# Patient Record
Sex: FEMALE | Race: OTHER
[De-identification: names, ages, dates, MRNs, and addresses within clinical notes are randomized per-mention and may not be internally consistent; named-entity substitution may affect disease eponyms.]

---

## 2021-04-10 ENCOUNTER — HOSPITAL ENCOUNTER (EMERGENCY)
Dept: HOSPITAL 46 - ED | Age: 41
Discharge: HOME | End: 2021-04-10
Payer: COMMERCIAL

## 2021-04-10 VITALS — BODY MASS INDEX: 29.44 KG/M2 | WEIGHT: 159.99 LBS | HEIGHT: 62 IN

## 2021-04-10 DIAGNOSIS — F22: Primary | ICD-10-CM

## 2021-04-10 DIAGNOSIS — F17.200: ICD-10-CM

## 2021-04-10 DIAGNOSIS — F15.10: ICD-10-CM

## 2021-04-10 NOTE — XMS
PreManage Notification: WINNIE BERMUDEZ MRN:[id number]
 
Security Information
 
Security Events
No recent Security Events currently on file
 
 
 
CRITERIA MET
------------
- [facility] - Has Care Guidelines
 
 
CARE PROVIDERS
There are no care providers on record at this time.
 
Guidelines Source: [facility] - [place]
Guidelines Date: 06/30/2020
 
Care Coordination:
    Member is currently not engaged in [employer] services. If Mental Health services 
    are needed, please contact:
    [place] [phone]
    [place]/ [place] [phone]
    [facility] Line [phone]
 
 
E.D. VISIT COUNT (12 MO.)
-------------------------------------------------------------------------------------
1 [facility]
-------------------------------------------------------------------------------------
TOTAL 1
-------------------------------------------------------------------------------------
NOTE: Visits indicate total known visits.
 
ED/UCC VISIT TRACKING (12 MO.)
-------------------------------------------------------------------------------------
04/10/2021 19:26
[facility] [place]
 
TYPE: Emergency
 
COMPLAINT:
- HEAD ISSUES
-------------------------------------------------------------------------------------
 
 
INPATIENT VISIT TRACKING (12 MO.)
No inpatient visits to display in this time frame
 
[email or web address]

## 2021-04-28 ENCOUNTER — HOSPITAL ENCOUNTER (EMERGENCY)
Dept: HOSPITAL 46 - ED | Age: 41
LOS: 1 days | Discharge: HOME | End: 2021-04-29
Payer: MEDICARE

## 2021-04-28 VITALS — HEIGHT: 62 IN | WEIGHT: 159.99 LBS | BODY MASS INDEX: 29.44 KG/M2

## 2021-04-28 DIAGNOSIS — S82.842A: Primary | ICD-10-CM

## 2021-04-28 DIAGNOSIS — R45.1: ICD-10-CM

## 2021-04-28 DIAGNOSIS — F17.200: ICD-10-CM
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
## 2021-04-28 NOTE — XMS
PreManage Notification: WINNIE BERMUDEZ MRN:[id number]
 
Security Information
 
Security Events
No recent Security Events currently on file
 
 
 
CRITERIA MET
------------
- [facility] [place] [facility]
- [facility] - 2 Visits in 30 Days
 
 
CARE PROVIDERS
There are no care providers on record at this time.
 
Guidelines Source: [facility] [place]
Guidelines Date: 06/30/2020
 
Care Coordination:
    Member is currently not engaged in [employer] services. If Mental Health services 
    are needed, please contact:
    [place] [phone]
    [place]/ [place] [phone]
    [facility] [place] [phone]
 
Care History
Medical/Surgical
04/14/2021    [facility] CONTACTED FOR PATIENT ED VISIT 04/10/2021.
E.D. VISIT COUNT (12 MO.)
-------------------------------------------------------------------------------------
2 [facility].
-------------------------------------------------------------------------------------
TOTAL 2
-------------------------------------------------------------------------------------
NOTE: Visits indicate total known visits.
 
ED/UCC VISIT TRACKING (12 MO.)
-------------------------------------------------------------------------------------
04/28/2021 22:34
[facility] [place]
 
TYPE: Emergency
 
COMPLAINT:
- MULTIPLE COMPLAINTS
-------------------------------------------------------------------------------------
04/10/2021 19:26
[facility] [place]
 
TYPE: Emergency
 
COMPLAINT:
- HEAD ISSUES
 
 
DIAGNOSES:
- Delusional disorders
- Other stimulant abuse, uncomplicated
- Nicotine dependence, unspecified, uncomplicated
- Auditory hallucinations
-------------------------------------------------------------------------------------
 
 
INPATIENT VISIT TRACKING (12 MO.)
No inpatient visits to display in this time frame
 
[email or web address]

## 2021-05-06 ENCOUNTER — HOSPITAL ENCOUNTER (EMERGENCY)
Dept: HOSPITAL 46 - ED | Age: 41
Discharge: HOME | End: 2021-05-06
Payer: COMMERCIAL

## 2021-05-06 VITALS — BODY MASS INDEX: 25.76 KG/M2 | WEIGHT: 139.99 LBS | HEIGHT: 62 IN

## 2021-05-06 DIAGNOSIS — F17.200: ICD-10-CM

## 2021-05-06 DIAGNOSIS — S82.842A: Primary | ICD-10-CM

## 2021-05-06 DIAGNOSIS — Z79.899: ICD-10-CM
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
## 2021-05-06 NOTE — XMS
PreManage Notification: WINNIE BERMUDEZ MRN:[id number]
 
Security Information
 
Security Events
No recent Security Events currently on file
 
 
 
CRITERIA MET
------------
- [facility] - Has Care Guidelines
- [facility] - 2 Visits in 30 Days
 
 
CARE PROVIDERS
There are no care providers on record at this time.
 
Guidelines Source: [facility] [place]
Guidelines Date: 06/30/2020
 
Care Coordination:
    Member is currently not engaged in [facility] services. If Mental Health services 
    are needed, please contact:
    [place] [phone]
    [place]/ [place] [phone]
    Crisis Line [phone]
 
Care History
Medical/Surgical
04/14/2021    [facility] CONTACTED FOR PATIENT ED VISIT 04/10/2021.
E.D. VISIT COUNT (12 MO.)
-------------------------------------------------------------------------------------
4 [facility].
-------------------------------------------------------------------------------------
TOTAL 4
-------------------------------------------------------------------------------------
NOTE: Visits indicate total known visits.
 
ED/UCC VISIT TRACKING (12 MO.)
-------------------------------------------------------------------------------------
05/06/2021 18:26
[facility] [place]
 
TYPE: Emergency
 
COMPLAINT:
- VOMITING, LT LEG PAIN
-------------------------------------------------------------------------------------
05/01/2021 17:27
[facility] [place]
 
TYPE: Emergency
 
COMPLAINT:
- LEG IS HURTING
-------------------------------------------------------------------------------------
 
04/28/2021 22:34
[facility] [place]
 
TYPE: Emergency
 
COMPLAINT:
- MULTIPLE COMPLAINTS
 
DIAGNOSES:
- Exposure to other specified factors, initial encounter
- Nicotine dependence, unspecified, uncomplicated
- Restlessness and agitation
- Displaced bimalleolar fracture of left lower leg, initial encounter for closed
fracture
-------------------------------------------------------------------------------------
04/10/2021 19:26
[facility] [place]
 
TYPE: Emergency
 
COMPLAINT:
- HEAD ISSUES
 
DIAGNOSES:
- Delusional disorders
- Other stimulant abuse, uncomplicated
- Nicotine dependence, unspecified, uncomplicated
- Auditory hallucinations
-------------------------------------------------------------------------------------
 
 
INPATIENT VISIT TRACKING (12 MO.)
No inpatient visits to display in this time frame
 
[email or web address]